# Patient Record
Sex: MALE | Race: WHITE | NOT HISPANIC OR LATINO | Employment: OTHER | URBAN - NONMETROPOLITAN AREA
[De-identification: names, ages, dates, MRNs, and addresses within clinical notes are randomized per-mention and may not be internally consistent; named-entity substitution may affect disease eponyms.]

---

## 2023-11-22 PROBLEM — R00.1 SINUS BRADYCARDIA: Status: ACTIVE | Noted: 2023-11-22

## 2023-11-22 PROBLEM — R07.9 CHEST PAIN: Status: ACTIVE | Noted: 2023-11-22

## 2023-11-22 PROBLEM — I25.10 ARTERIOSCLEROTIC CORONARY ARTERY DISEASE: Status: ACTIVE | Noted: 2023-11-22

## 2023-11-22 PROBLEM — Z95.1 S/P CABG (CORONARY ARTERY BYPASS GRAFT): Status: ACTIVE | Noted: 2023-11-22

## 2023-11-22 PROBLEM — I44.2 COMPLETE HEART BLOCK (MULTI): Status: ACTIVE | Noted: 2023-11-22

## 2023-11-22 PROBLEM — E78.5 HYPERLIPIDEMIA: Status: ACTIVE | Noted: 2023-11-22

## 2023-11-22 PROBLEM — I48.0 PAROXYSMAL ATRIAL FIBRILLATION (MULTI): Status: ACTIVE | Noted: 2023-11-22

## 2023-11-22 PROBLEM — I44.0 AV BLOCK, 1ST DEGREE: Status: ACTIVE | Noted: 2023-11-22

## 2023-11-22 PROBLEM — I95.9 HYPOTENSION: Status: ACTIVE | Noted: 2023-11-22

## 2023-11-22 PROBLEM — Z85.46 H/O PROSTATE CANCER: Status: ACTIVE | Noted: 2023-11-22

## 2023-11-22 PROBLEM — Z95.0 CARDIAC PACEMAKER IN SITU: Status: ACTIVE | Noted: 2023-11-22

## 2023-11-22 RX ORDER — CHOLECALCIFEROL (VITAMIN D3) 25 MCG
1 TABLET ORAL DAILY
COMMUNITY

## 2023-11-22 RX ORDER — OXYCODONE AND ACETAMINOPHEN 5; 325 MG/1; MG/1
1 TABLET ORAL
COMMUNITY
End: 2023-11-27 | Stop reason: ALTCHOICE

## 2023-11-22 RX ORDER — CLOPIDOGREL BISULFATE 75 MG/1
1 TABLET ORAL DAILY
COMMUNITY
Start: 2022-02-02 | End: 2024-01-08

## 2023-11-22 RX ORDER — ATORVASTATIN CALCIUM 10 MG/1
1 TABLET, FILM COATED ORAL NIGHTLY
COMMUNITY
Start: 2022-02-02 | End: 2024-02-27

## 2023-11-22 RX ORDER — ASPIRIN 81 MG/1
81 TABLET ORAL DAILY
COMMUNITY
Start: 2021-08-18 | End: 2023-12-06

## 2023-11-22 RX ORDER — AMIODARONE HYDROCHLORIDE 200 MG/1
200 TABLET ORAL DAILY
COMMUNITY
Start: 2022-02-24 | End: 2024-02-26

## 2023-11-22 RX ORDER — POTASSIUM CHLORIDE 750 MG/1
1 TABLET, FILM COATED, EXTENDED RELEASE ORAL DAILY
COMMUNITY
End: 2024-01-08

## 2023-11-27 ENCOUNTER — OFFICE VISIT (OUTPATIENT)
Dept: CARDIOLOGY | Facility: CLINIC | Age: 88
End: 2023-11-27
Payer: MEDICARE

## 2023-11-27 VITALS
SYSTOLIC BLOOD PRESSURE: 130 MMHG | WEIGHT: 177 LBS | HEART RATE: 71 BPM | DIASTOLIC BLOOD PRESSURE: 56 MMHG | HEIGHT: 67 IN | BODY MASS INDEX: 27.78 KG/M2

## 2023-11-27 DIAGNOSIS — I48.0 PAROXYSMAL ATRIAL FIBRILLATION (MULTI): ICD-10-CM

## 2023-11-27 DIAGNOSIS — H34.212 HOLLENHORST PLAQUE, LEFT EYE: ICD-10-CM

## 2023-11-27 DIAGNOSIS — I25.10 ARTERIOSCLEROTIC CORONARY ARTERY DISEASE: Primary | ICD-10-CM

## 2023-11-27 DIAGNOSIS — Z79.899 HIGH RISK MEDICATION USE: ICD-10-CM

## 2023-11-27 DIAGNOSIS — Z95.0 CARDIAC PACEMAKER IN SITU: ICD-10-CM

## 2023-11-27 DIAGNOSIS — E78.2 MIXED HYPERLIPIDEMIA: ICD-10-CM

## 2023-11-27 PROBLEM — I44.2 COMPLETE HEART BLOCK (MULTI): Status: RESOLVED | Noted: 2023-11-22 | Resolved: 2023-11-27

## 2023-11-27 PROBLEM — I44.0 AV BLOCK, 1ST DEGREE: Status: RESOLVED | Noted: 2023-11-22 | Resolved: 2023-11-27

## 2023-11-27 PROBLEM — Z95.1 S/P CABG (CORONARY ARTERY BYPASS GRAFT): Status: RESOLVED | Noted: 2023-11-22 | Resolved: 2023-11-27

## 2023-11-27 PROBLEM — R07.9 CHEST PAIN: Status: RESOLVED | Noted: 2023-11-22 | Resolved: 2023-11-27

## 2023-11-27 PROBLEM — R00.1 SINUS BRADYCARDIA: Status: RESOLVED | Noted: 2023-11-22 | Resolved: 2023-11-27

## 2023-11-27 PROBLEM — I10 ESSENTIAL HYPERTENSION: Status: ACTIVE | Noted: 2023-11-27

## 2023-11-27 PROBLEM — I95.9 HYPOTENSION: Status: RESOLVED | Noted: 2023-11-22 | Resolved: 2023-11-27

## 2023-11-27 PROBLEM — I10 ESSENTIAL HYPERTENSION: Status: RESOLVED | Noted: 2023-11-27 | Resolved: 2023-11-27

## 2023-11-27 PROCEDURE — 1159F MED LIST DOCD IN RCRD: CPT | Performed by: NURSE PRACTITIONER

## 2023-11-27 PROCEDURE — 1160F RVW MEDS BY RX/DR IN RCRD: CPT | Performed by: NURSE PRACTITIONER

## 2023-11-27 PROCEDURE — 93000 ELECTROCARDIOGRAM COMPLETE: CPT | Performed by: NURSE PRACTITIONER

## 2023-11-27 PROCEDURE — 99214 OFFICE O/P EST MOD 30 MIN: CPT | Performed by: NURSE PRACTITIONER

## 2023-11-27 PROCEDURE — 1036F TOBACCO NON-USER: CPT | Performed by: NURSE PRACTITIONER

## 2023-11-27 RX ORDER — ACETAMINOPHEN 500 MG
500 TABLET ORAL 2 TIMES DAILY
COMMUNITY
Start: 2023-10-20

## 2023-11-27 ASSESSMENT — ENCOUNTER SYMPTOMS
IRREGULAR HEARTBEAT: 0
SYNCOPE: 0
ORTHOPNEA: 0
PALPITATIONS: 0
PND: 0
DYSPNEA ON EXERTION: 0
NEAR-SYNCOPE: 0

## 2023-11-27 NOTE — ASSESSMENT & PLAN NOTE
[facility] 2272 Assurity dual-chamber permanent pacemaker  Device interrogation February 2023 V paced 99%  Reports device interrogation was completed last week, will obtain

## 2023-11-27 NOTE — ASSESSMENT & PLAN NOTE
Amiodarone start date January 2019  Surveillance testing June 2023  EKG in office today normal sinus rhythm, QTc 491

## 2023-11-27 NOTE — PATIENT INSTRUCTIONS
Please bring all medicines, vitamins, and herbal supplements with you when you come to the office.    Prescriptions will not be filled unless you are compliant with your follow up appointments or have a follow up appointment scheduled as per instruction of your physician. Refills should be requested at the time of your visit.    EKG done in office today    PLAN:   Through informed decision making process incorporating patients unique circumstances, the following treatment plan will be initiated:    1.  Prescription drug management of cardiovascular medication for efficacy, adherence to treatment, side effect assessment and polypharmacy. Current treatment clinically warranted and to continue without modifications.    2.  Carotid USN (hollenhorst plaque, PAF)    3. Return for follow-up; in the interim, contact the office if new symptoms arise.  Dr. [facility] 9 months     4.  Amiodarone surveillance and device interrogation as arranged

## 2023-11-27 NOTE — PROGRESS NOTES
"Chief Complaint  \" Doing fairly well\"    Reason for Visit  Routine 9-month follow-up  Patient presents to the office today for outpatient follow-up for coronary artery disease, normal LVEF, secondary prevention and paroxysmal atrial fibrillation.  Last evaluated in clinic by Dr. Ramirez March 2023.    Presents today ambulatory with steady gait.  Accompanied by patient  Patient continues to follow routinely with PCP.  Within the last month he has had skin cancer removal and DAPT was interrupted.  Reports being diagnosed with COVID approximately 2 weeks ago with residual URI symptoms.  Did not require hospitalization.    History of Present Illness   Patient is a extremely pleasant 88-year-old gentleman who presents to the office today reportedly\" just slowing down\" due to recent COVID-positive illness.  On a daily basis he completes ADLs, he has stairs at home that he has to go up and down.  He ambulated in from the parking without complaints.  He denies any change in exercise capacity or functional tolerance.  He is unable to recall prior anginal symptoms.  He denies any type of palpitations.    There is a request from ophthalmology appointment that due to new left Hollenhorst plaque would recommend carotid ultrasound.  Will obtain.  Otherwise we will check recent device interrogation to verify no atrial fibrillation, if increased A-fib burden may need to consider DOAC.    Patient reports that overall has no complaint(s) of chest pain, dyspnea, exertional chest pressure/discomfort, lower extremity edema, orthopnea, and syncope    Daily activity:    ADLs, functional ADLs, is able to go to the grocery store, has stairs at home.  Denies any change in exercise capacity or functional tolerance since last office visit.    The importance of secondary prevention reviewed:  HTN: No history  HLD: Treated with labs through PCP  DM: Denies  Smoker: Denies  BMI:  Reviewed the merits of healthy lifestyle choices on overall " "cardiovascular health.    Review of Systems   Cardiovascular:  Negative for chest pain, dyspnea on exertion, irregular heartbeat, leg swelling, near-syncope, orthopnea, palpitations, paroxysmal nocturnal dyspnea and syncope.        Visit Vitals  /56 (BP Location: Left arm, Patient Position: Sitting)   Pulse 71   Ht 1.702 m (5' 7\")   Wt 80.3 kg (177 lb)   BMI 27.72 kg/m²   Smoking Status Former   BSA 1.95 m²     Physical Exam  Vitals and nursing note reviewed.   Constitutional:       Appearance: Normal appearance.   Cardiovascular:      Rate and Rhythm: Normal rate and regular rhythm.      Heart sounds: Normal heart sounds.   Pulmonary:      Effort: Pulmonary effort is normal.      Breath sounds: Normal breath sounds.   Musculoskeletal:      Cervical back: Full passive range of motion without pain.      Right lower leg: No edema.      Left lower leg: No edema.   Skin:     General: Skin is cool.   Neurological:      Mental Status: He is alert and oriented to person, place, and time.   Psychiatric:         Attention and Perception: Attention normal.         Mood and Affect: Mood normal.         Behavior: Behavior is cooperative.      No Known Allergies    Current Outpatient Medications   Medication Instructions    acetaminophen (TYLENOL) 500 mg, oral, 2 times daily    amiodarone (PACERONE) 200 mg, oral, Daily    aspirin 81 mg, oral, Daily    atorvastatin (Lipitor) 10 mg tablet 1 tablet, oral, Nightly    cholecalciferol (Vitamin D-3) 25 MCG (1000 UT) tablet 1 tablet, oral, Daily    clopidogrel (Plavix) 75 mg tablet 1 tablet, oral, Daily    multivit-min/ferrous fumarate (MULTI VITAMIN ORAL) 1 tablet, oral, Daily    potassium chloride CR 10 mEq ER tablet 1 tablet, oral, Daily      Assessment:    High risk medication use  Amiodarone start date January 2019  Surveillance testing June 2023  EKG in office today normal sinus rhythm, QTc 491    Arteriosclerotic coronary artery disease  2006 coronary bypass grafting " x4  LIMA-LAD  SVG  - diagonal  SVG-circumflex  SVG-RCA    Current daily activity at 4 METS without concerning symptoms.    Cardiac and Vasculature  2019 TTE  EF 60 to 65%  MR mild  Pulmonary hypertension moderate    Cardiac pacemaker in situ  [facility] 2272 Assurity dual-chamber permanent pacemaker  Device interrogation February 2023 V paced 99%  Reports device interrogation was completed last week, will obtain    Hyperlipidemia  Low intensity statin  Annual wellness labs through PCP    Paroxysmal atrial fibrillation (CMS/HCC)  Maintaining normal sinus rhythm on amiodarone since January 2019    CHADS VASc 3 with decision not to anticoagulate due to maintenance of sinus rhythm and prior ligation left atrial appendage at time of CABG    BMI 27.0-27.9,adult  Reviewed the merits of healthy lifestyle choices on overall cardiovascular health.      Hollenhorst plaque, left eye  Noted September 2023 ophthalmology visit with recommendations for carotid ultrasound.    Plan:     Through informed decision making process incorporating patients unique circumstances, the following treatment plan will be initiated:    1.  Prescription drug management of cardiovascular medication for efficacy, adherence to treatment, side effect assessment and polypharmacy. Current treatment clinically warranted and to continue without modifications.    2.  Carotid USN (hollenhorst plaque, PAF)    3. Return for follow-up; in the interim, contact the office if new symptoms arise.  Dr. [facility] 9 months     4.  Amiodarone surveillance and device interrogation as arranged    Alisa Beckham  MSN, APRN-CNP, PMHNP-[facility]    Please excuse any errors in grammar or translation related to this dictation. Voice recognition software was utilized to prepare this document.

## 2023-11-27 NOTE — ASSESSMENT & PLAN NOTE
2006 coronary bypass grafting x4  LIMA-LAD  SVG  - diagonal  SVG-circumflex  SVG-RCA    Current daily activity at 4 METS without concerning symptoms.

## 2023-11-27 NOTE — ASSESSMENT & PLAN NOTE
Maintaining normal sinus rhythm on amiodarone since January 2019    CHADS VASc 3 with decision not to anticoagulate due to maintenance of sinus rhythm and prior ligation left atrial appendage at time of CABG

## 2023-12-01 DIAGNOSIS — I25.10 ARTERIOSCLEROTIC CORONARY ARTERY DISEASE: ICD-10-CM

## 2023-12-06 RX ORDER — ASPIRIN 81 MG/1
81 TABLET ORAL DAILY
Qty: 90 TABLET | Refills: 1 | Status: SHIPPED | OUTPATIENT
Start: 2023-12-06

## 2023-12-13 ENCOUNTER — TELEPHONE (OUTPATIENT)
Dept: CARDIOLOGY | Facility: CLINIC | Age: 88
End: 2023-12-13
Payer: MEDICARE

## 2023-12-13 DIAGNOSIS — Z79.899 HIGH RISK MEDICATION USE: ICD-10-CM

## 2023-12-13 DIAGNOSIS — I48.0 PAROXYSMAL ATRIAL FIBRILLATION (MULTI): ICD-10-CM

## 2023-12-13 NOTE — TELEPHONE ENCOUNTER
Amiodarone testing orders sent to [facility] due in December.  PFT paper order also completed.

## 2023-12-15 DIAGNOSIS — C44.90 PLEOMORPHIC DERMAL SARCOMA: Primary | ICD-10-CM

## 2023-12-15 NOTE — TUMOR BOARD NOTE
General Patient Information  Name:  Baltazar Remy  Evaluation #:  1  Conference Date:  12-  YOB: 1935  MRN:  [id number]  Program Physician(s):  Jorge Cramer  Referring Physician(s):  Preeti Calderon, Nick Oleary, Aris Goins (PCP), Asiya Tobias      Summary   Stage:  IIIB ([id number])    Assessment:  Scalp with a pleomorphic dermal sarcoma, with perineural and perivascular invasion, peripheral margin positive for a small focus at 1 o'clock, stage pT3 (outside pathology).  CT chest showed prominent periesophageal, periportal, and right axillary lymph nodes that cannot exclude developing metastatic disease and a 2 cm indeterminate lesion of the left adrenal gland.    Recommendation:  PET/CT.  Consider LN biopsy.  Resect the margin.  Radiation therapy.  If not resectable, consider systemic therapy.    Review Multidisciplinary Cutaneous Oncology Conference recommendation with patient.  Continue routine follow up and total body skin exams with Dermatology.    Follow Up:  Preeti Calderon, Asiya Tobias, Dermatology      History and Physical Exam  Dermatologic History:   88 y.o. male with an excision of the scalp on 11-3-2023 showing a pleomorphic dermal sarcoma, with perineural and perivascular invasion, peripheral margin positive for a small focus at 1 o'clock, stage pT3 (outside pathology).  CT chest on 12- showed prominent periesophageal, periportal, and right axillary lymph nodes that cannot exclude developing metastatic disease and a 2 cm indeterminate lesion of the left adrenal gland.    Past Medical History:  Prostate cancer 2007 s/p radiation, in remission    Family History of DNS/MM:  Unknown    Skin:  Large surgical incision healing well with 1 area of superficial wound dehiscence subcentimeter in size.  There is also a small palpable soft seroma at the location of the primary tumor on the left scalp.    Lymphatic:  No cervical, supraclavicular, or inguinal adenopathy.  After  reviewing his chest CT, he was noted to have palpable right axillary about 2 cm, mobile, nontender.

## 2023-12-18 ENCOUNTER — LAB REQUISITION (OUTPATIENT)
Dept: LAB | Facility: HOSPITAL | Age: 88
End: 2023-12-18
Payer: MEDICARE

## 2023-12-18 ENCOUNTER — TUMOR BOARD CONFERENCE (OUTPATIENT)
Dept: HEMATOLOGY/ONCOLOGY | Facility: HOSPITAL | Age: 88
End: 2023-12-18
Payer: MEDICARE

## 2023-12-18 DIAGNOSIS — L98.9 DISORDER OF THE SKIN AND SUBCUTANEOUS TISSUE, UNSPECIFIED: ICD-10-CM

## 2023-12-18 PROCEDURE — 88321 CONSLTJ&REPRT SLD PREP ELSWR: CPT

## 2023-12-18 PROCEDURE — 88321 CONSLTJ&REPRT SLD PREP ELSWR: CPT | Performed by: DERMATOLOGY

## 2023-12-19 ENCOUNTER — APPOINTMENT (OUTPATIENT)
Dept: CARDIOLOGY | Facility: CLINIC | Age: 88
End: 2023-12-19
Payer: MEDICARE

## 2023-12-20 ENCOUNTER — HOSPITAL ENCOUNTER (OUTPATIENT)
Dept: CARDIOLOGY | Facility: CLINIC | Age: 88
Discharge: HOME | End: 2023-12-20
Payer: MEDICARE

## 2023-12-20 DIAGNOSIS — H34.212 HOLLENHORST PLAQUE, LEFT EYE: ICD-10-CM

## 2023-12-20 PROCEDURE — 93880 EXTRACRANIAL BILAT STUDY: CPT

## 2023-12-20 PROCEDURE — 93880 EXTRACRANIAL BILAT STUDY: CPT | Performed by: INTERNAL MEDICINE

## 2023-12-21 LAB
PATH REPORT.FINAL DX SPEC: NORMAL
PATH REPORT.GROSS SPEC: NORMAL
PATH REPORT.MICROSCOPIC SPEC OTHER STN: NORMAL
PATH REPORT.RELEVANT HX SPEC: NORMAL
PATH REPORT.TOTAL CANCER: NORMAL

## 2024-01-04 ENCOUNTER — TELEPHONE (OUTPATIENT)
Dept: CARDIOLOGY | Facility: CLINIC | Age: 89
End: 2024-01-04
Payer: MEDICARE

## 2024-01-04 DIAGNOSIS — I65.29 STENOSIS OF CAROTID ARTERY, UNSPECIFIED LATERALITY: Primary | ICD-10-CM

## 2024-01-04 DIAGNOSIS — H34.213 HOLLENHORST PLAQUE, BOTH EYES: ICD-10-CM

## 2024-01-04 NOTE — TELEPHONE ENCOUNTER
Results were successfully communicated with the patient and they acknowledged their understanding.    Gave orders verbalized understanding. Task sent to  to call and arrange once order signed.

## 2024-01-04 NOTE — TELEPHONE ENCOUNTER
----- Message from [facility] sent at 1/3/2024 10:15 AM EST -----  B/L 50-69% carotid stenosis  Continue ASA/statin as he is doing  Repeat carotid USN in one year   ----- Message -----  From: Nikia, Syngo - Cardiology Results In  Sent: 12/20/2023   3:37 PM EST  To: [facility]

## 2024-01-08 DIAGNOSIS — I25.10 ARTERIOSCLEROTIC CORONARY ARTERY DISEASE: ICD-10-CM

## 2024-01-08 DIAGNOSIS — I48.0 PAROXYSMAL ATRIAL FIBRILLATION (MULTI): ICD-10-CM

## 2024-01-08 RX ORDER — POTASSIUM CHLORIDE 750 MG/1
10 TABLET, EXTENDED RELEASE ORAL DAILY
Qty: 90 TABLET | Refills: 3 | Status: SHIPPED | OUTPATIENT
Start: 2024-01-08

## 2024-01-08 RX ORDER — CLOPIDOGREL BISULFATE 75 MG/1
75 TABLET ORAL DAILY
Qty: 90 TABLET | Refills: 3 | Status: SHIPPED | OUTPATIENT
Start: 2024-01-08

## 2024-02-02 ENCOUNTER — TELEPHONE (OUTPATIENT)
Dept: CARDIOLOGY | Facility: CLINIC | Age: 89
End: 2024-02-02
Payer: MEDICARE

## 2024-02-02 NOTE — TELEPHONE ENCOUNTER
Patient was due for Amiodarone testing at [facility]. PFT in chart. No labs or xray. Attempted to phone patient. No answer.  Unable to leave message.

## 2024-02-09 ENCOUNTER — TELEPHONE (OUTPATIENT)
Dept: CARDIOLOGY | Facility: CLINIC | Age: 89
End: 2024-02-09
Payer: MEDICARE

## 2024-02-09 NOTE — TELEPHONE ENCOUNTER
[facility] would like to perform a radiofrequent ablation on his back. They are requesting POC for the procedure to  be done, no need to hold medications.    Phone [phone]  Fax [phone]    To Dr. [facility] MD for review.

## 2024-02-17 DIAGNOSIS — I48.0 PAROXYSMAL ATRIAL FIBRILLATION (MULTI): ICD-10-CM

## 2024-02-23 NOTE — TELEPHONE ENCOUNTER
Spoke with patient. He has been taking Amiodarone 1/2 tablet daily. Previous EHR does show 200 mg 1/2 tablet daily.     To Dr. [facility] MD to verify RX

## 2024-02-26 DIAGNOSIS — E78.2 MIXED HYPERLIPIDEMIA: ICD-10-CM

## 2024-02-26 RX ORDER — AMIODARONE HYDROCHLORIDE 200 MG/1
100 TABLET ORAL DAILY
Qty: 45 TABLET | Refills: 1 | Status: SHIPPED | OUTPATIENT
Start: 2024-02-26 | End: 2024-03-14

## 2024-02-27 RX ORDER — ATORVASTATIN CALCIUM 10 MG/1
10 TABLET, FILM COATED ORAL NIGHTLY
Qty: 90 TABLET | Refills: 3 | Status: SHIPPED | OUTPATIENT
Start: 2024-02-27 | End: 2025-02-26

## 2024-03-14 DIAGNOSIS — I48.0 PAROXYSMAL ATRIAL FIBRILLATION (MULTI): ICD-10-CM

## 2024-03-14 RX ORDER — AMIODARONE HYDROCHLORIDE 200 MG/1
TABLET ORAL
Qty: 45 TABLET | Refills: 1 | Status: SHIPPED | OUTPATIENT
Start: 2024-03-14

## 2024-05-20 PROCEDURE — 93280 PM DEVICE PROGR EVAL DUAL: CPT | Performed by: INTERNAL MEDICINE

## 2024-08-05 ENCOUNTER — APPOINTMENT (OUTPATIENT)
Dept: CARDIOLOGY | Facility: CLINIC | Age: 89
End: 2024-08-05
Payer: MEDICARE

## 2024-08-06 ENCOUNTER — APPOINTMENT (OUTPATIENT)
Dept: CARDIOLOGY | Facility: CLINIC | Age: 89
End: 2024-08-06
Payer: MEDICARE

## 2024-09-04 ENCOUNTER — TELEPHONE (OUTPATIENT)
Dept: CARDIOLOGY | Facility: CLINIC | Age: 89
End: 2024-09-04
Payer: MEDICARE

## 2024-09-04 DIAGNOSIS — I44.2 ATRIOVENTRICULAR BLOCK, COMPLETE (MULTI): ICD-10-CM

## 2024-09-04 DIAGNOSIS — Z95.0 CARDIAC PACEMAKER IN SITU: ICD-10-CM

## 2024-09-05 ENCOUNTER — APPOINTMENT (OUTPATIENT)
Dept: CARDIOLOGY | Facility: CLINIC | Age: 89
End: 2024-09-05
Payer: MEDICARE

## 2024-09-23 ENCOUNTER — APPOINTMENT (OUTPATIENT)
Dept: CARDIOLOGY | Facility: CLINIC | Age: 89
End: 2024-09-23
Payer: MEDICARE

## 2024-11-11 ENCOUNTER — APPOINTMENT (OUTPATIENT)
Dept: CARDIOLOGY | Facility: CLINIC | Age: 89
End: 2024-11-11
Payer: MEDICARE

## 2024-11-11 VITALS
SYSTOLIC BLOOD PRESSURE: 118 MMHG | BODY MASS INDEX: 26.09 KG/M2 | HEART RATE: 61 BPM | HEIGHT: 67 IN | DIASTOLIC BLOOD PRESSURE: 58 MMHG | WEIGHT: 166.2 LBS

## 2024-11-11 DIAGNOSIS — E78.2 MIXED HYPERLIPIDEMIA: ICD-10-CM

## 2024-11-11 DIAGNOSIS — I48.0 PAROXYSMAL ATRIAL FIBRILLATION (MULTI): ICD-10-CM

## 2024-11-11 DIAGNOSIS — Z87.891 FORMER CIGAR SMOKER: ICD-10-CM

## 2024-11-11 DIAGNOSIS — Z76.89 ENCOUNTER TO ESTABLISH CARE WITH NEW DOCTOR: ICD-10-CM

## 2024-11-11 DIAGNOSIS — Z71.2 ENCOUNTER TO DISCUSS TEST RESULTS: ICD-10-CM

## 2024-11-11 DIAGNOSIS — N18.31 STAGE 3A CHRONIC KIDNEY DISEASE (MULTI): ICD-10-CM

## 2024-11-11 DIAGNOSIS — I25.10 ARTERIOSCLEROTIC CORONARY ARTERY DISEASE: ICD-10-CM

## 2024-11-11 DIAGNOSIS — Z79.899 HIGH RISK MEDICATION USE: ICD-10-CM

## 2024-11-11 DIAGNOSIS — Z95.0 CARDIAC PACEMAKER IN SITU: ICD-10-CM

## 2024-11-11 PROCEDURE — 99214 OFFICE O/P EST MOD 30 MIN: CPT | Performed by: INTERNAL MEDICINE

## 2024-11-11 PROCEDURE — 1157F ADVNC CARE PLAN IN RCRD: CPT | Performed by: INTERNAL MEDICINE

## 2024-11-11 PROCEDURE — G2211 COMPLEX E/M VISIT ADD ON: HCPCS | Performed by: INTERNAL MEDICINE

## 2024-11-11 PROCEDURE — 1160F RVW MEDS BY RX/DR IN RCRD: CPT | Performed by: INTERNAL MEDICINE

## 2024-11-11 PROCEDURE — 1036F TOBACCO NON-USER: CPT | Performed by: INTERNAL MEDICINE

## 2024-11-11 PROCEDURE — 93000 ELECTROCARDIOGRAM COMPLETE: CPT | Performed by: INTERNAL MEDICINE

## 2024-11-11 PROCEDURE — 1159F MED LIST DOCD IN RCRD: CPT | Performed by: INTERNAL MEDICINE

## 2024-11-11 RX ORDER — AMIODARONE HYDROCHLORIDE 200 MG/1
100 TABLET ORAL DAILY
Qty: 45 TABLET | Refills: 1 | Status: SHIPPED | OUTPATIENT
Start: 2024-11-11

## 2024-11-11 NOTE — LETTER
November 11, 2024     Aris Goins DO  [address]  [facility] [address]    Patient: Baltazar Remy   YOB: 1935   Date of Visit: 11/11/2024       Dear Dr. Aris Goins, DO:    Thank you for referring Baltazar Remy to me for evaluation. Below are my notes for this consultation.  If you have questions, please do not hesitate to call me. I look forward to following your patient along with you.       Sincerely,     Veronica Gallardo MD      CC: No Recipients  ______________________________________________________________________________________      Patient is new to this provider, last office visit was in November 2023 at which time patient was seen by Alisa Beckham NP I have reviewed the notes and incorporated some of the information into the present chart.  Subjective :     Interval review of systems is negative for chest discomfort pressure tightness heaviness palpitations lightheadedness orthopnea paroxysmal nocturnal dyspnea dependent edema or claudication TIA or CVA type symptoms or bleeding diathesis  Since last visit, patient has had extensive surgery scalp, apparently is going through chemotherapy now, and also had radiation therapy.  Details are not available at this time.  No obvious amiodarone toxicity.    History so Far :    High risk medication use  Amiodarone start date January 2019      Arteriosclerotic coronary artery disease  2006 coronary bypass grafting x4  LIMA-LAD  SVG  - diagonal  SVG-circumflex  SVG-RCA     Current daily activity at 4 METS without concerning symptoms.     Cardiac and Vasculature  2019 TTE  EF 60 to 65%  MR mild  Pulmonary hypertension moderate     Cardiac pacemaker in situ  [facility] 2272 Assurity dual-chamber permanent pacemaker  Device interrogation February 2023 V paced 99%  Reports device interrogation was completed last week, will obtain     Hyperlipidemia  Low intensity statin  Annual wellness labs through PCP     Paroxysmal  "atrial fibrillation (CMS/HCC)  Maintaining normal sinus rhythm on amiodarone since January 2019     CHADS VASc 3 with decision not to anticoagulate due to maintenance of sinus rhythm and prior ligation left atrial appendage at time of CABG     BMI 27.0-27.9,adult  Reviewed the merits of healthy lifestyle choices on overall cardiovascular health.        Hollenhorst plaque, left eye  Noted September 2023 ophthalmology visit with recommendations for carotid ultrasound.     Carotid ultrasound December 2023-50 to 69% bilateral internal carotid artery stenosis with bilateral antegrade vertebral flow peak velocity on the right 1 45 cm/s, on the left 145 cm/s  Objective   Wt Readings from Last 3 Encounters:   11/11/24 75.4 kg (166 lb 3.2 oz)   11/27/23 80.3 kg (177 lb)   03/08/23 80.7 kg (178 lb)            Vitals:    11/11/24 1241   BP: 118/58   BP Location: Right arm   Patient Position: Sitting   Pulse: 61   Weight: 75.4 kg (166 lb 3.2 oz)   Height: 1.702 m (5' 7\")        Physical Exam:    GENERAL APPEARANCE: in no acute distress.  CHEST: Symmetric and non-tender.  Pacer generator left infraclavicular area  INTEGUMENT: Skin warm and dry  HEENT: Well-healed large incision over the vortex of the head.  NECK: Supple, no JVD, no bruit.   NEURO/PSHCY: Alert and oriented x3; appropriate behavior and responses and responses  LUNGS: Clear to auscultation bilaterally; normal respiratory effort.  HEART: Rate and rhythm regular with no evident murmur; no gallop appreciated.   ABDOMEN: Soft, non tender.  MUSCULOSKELETAL: No gross deformities.  EXTREMITIES: Warm  There is no edema noted.    Meds:  Current Outpatient Medications   Medication Instructions   • acetaminophen (TYLENOL) 500 mg, 2 times daily   • amiodarone (Pacerone) 200 mg tablet TAKE 1/2 TABLET ONE TIME DAILY   • aspirin 81 mg, oral, Daily, as directed   • atorvastatin (LIPITOR) 10 mg, oral, Nightly   • cholecalciferol (Vitamin D-3) 25 MCG (1000 UT) tablet 1 tablet, Daily "   • clopidogrel (PLAVIX) 75 mg, oral, Daily   • multivit-min/ferrous fumarate (MULTI VITAMIN ORAL) 1 tablet, Daily   • potassium chloride CR 10 mEq ER tablet 10 mEq, oral, Daily          No Known Allergies    Device interrogation August 2024-atrial paced 28% ventricular paced more than 99% battery life 5 years to more changing episodes EGM review shows atrial lead noise atrial fibrillation was not reported  LABS:               October 2022-BUN 17 creatinine 1.17 GFR 59.6 sodium 141 potassium 4.6 creatinine clearance 40 liver enzymes normal TSH mildly elevated 5.6 Free T4 mildly elevated 1.19 hemoglobin 11.7 hematocrit 34.3 MCV 99.9 platelet count 1 53,000      Patient Active Problem List    Diagnosis Date Noted   • Encounter to establish care with new doctor 11/11/2024   • Former cigar smoker 11/11/2024   • Encounter to discuss test results 11/11/2024   • Stage 3a chronic kidney disease (Multi) 11/11/2024   • High risk medication use 11/27/2023   • Body mass index (BMI) 26.0-26.9, adult 11/27/2023   • Hollenhorst plaque, left eye 11/27/2023   • Arteriosclerotic coronary artery disease 11/22/2023   • Cardiac pacemaker in situ 11/22/2023   • H/O prostate cancer 11/22/2023   • Hyperlipidemia 11/22/2023   • Paroxysmal atrial fibrillation (Multi) 11/22/2023                 Assessment:    1. Arteriosclerotic coronary artery disease  Follow Up In Cardiology      2. Paroxysmal atrial fibrillation (Multi)  Follow Up In Cardiology      3. High risk medication use        4. Cardiac pacemaker in situ        5. Mixed hyperlipidemia        6. Body mass index (BMI) 26.0-26.9, adult        7. Encounter to establish care with new doctor        8. Former cigar smoker        9. Encounter to discuss test results        10. Stage 3a chronic kidney disease (Multi)           Clinical decision making:  Clinically patient is doing well.  I have no details pertaining to his malignancy and the surgery for the malignancy.  He says the  procedure was done at [facility] about 3 months ago.  He is going through chemotherapy now.  Device interrogation does not suggest any atrial fibrillation.  We will continue the low-dose amiodarone.  No clinical amiodarone toxicity at this time.  For the carotid disease, recheck carotid ultrasound prior to next visit, continue aspirin and statin therapy with LDL goal of 70 or below I do not see any recent lipid profile in the chart.  Patient is on 10 mg of atorvastatin.  He also remains on dual antiplatelet therapy.  We will check a lipid profile.  Follow up : 1 year        Provider Attestation - Lorena GREY LPN Scribe documentation    All medical record entries made by the Scribe were at my direction and personally dictated by me. I have reviewed the chart and agree that the record accurately reflects my personal performance of the history, physical exam, discussion and plan.

## 2024-11-11 NOTE — PROGRESS NOTES
Patient is new to this provider, last office visit was in November 2023 at which time patient was seen by Alisa Beckham NP I have reviewed the notes and incorporated some of the information into the present chart.  Subjective :     Interval review of systems is negative for chest discomfort pressure tightness heaviness palpitations lightheadedness orthopnea paroxysmal nocturnal dyspnea dependent edema or claudication TIA or CVA type symptoms or bleeding diathesis  Since last visit, patient has had extensive surgery scalp, apparently is going through chemotherapy now, and also had radiation therapy.  Details are not available at this time.  No obvious amiodarone toxicity.    History so Far :    High risk medication use  Amiodarone start date January 2019      Arteriosclerotic coronary artery disease  2006 coronary bypass grafting x4  LIMA-LAD  SVG  - diagonal  SVG-circumflex  SVG-RCA     Current daily activity at 4 METS without concerning symptoms.     Cardiac and Vasculature  2019 TTE  EF 60 to 65%  MR mild  Pulmonary hypertension moderate     Cardiac pacemaker in situ  [facility] 2272 Assurity dual-chamber permanent pacemaker  Device interrogation February 2023 V paced 99%  Reports device interrogation was completed last week, will obtain     Hyperlipidemia  Low intensity statin  Annual wellness labs through PCP     Paroxysmal atrial fibrillation (CMS/[facility])  Maintaining normal sinus rhythm on amiodarone since January 2019     CHADS VASc 3 with decision not to anticoagulate due to maintenance of sinus rhythm and prior ligation left atrial appendage at time of CABG     BMI 27.0-27.9,adult  Reviewed the merits of healthy lifestyle choices on overall cardiovascular health.        [place] plaque, left eye  Noted September 2023 ophthalmology visit with recommendations for carotid ultrasound.     Carotid ultrasound December 2023-50 to 69% bilateral internal carotid artery stenosis with bilateral antegrade  "vertebral flow peak velocity on the right 1 45 cm/s, on the left 145 cm/s  Objective   Wt Readings from Last 3 Encounters:   11/11/24 75.4 kg (166 lb 3.2 oz)   11/27/23 80.3 kg (177 lb)   03/08/23 80.7 kg (178 lb)            Vitals:    11/11/24 1241   BP: 118/58   BP Location: Right arm   Patient Position: Sitting   Pulse: 61   Weight: 75.4 kg (166 lb 3.2 oz)   Height: 1.702 m (5' 7\")        Physical Exam:    GENERAL APPEARANCE: in no acute distress.  CHEST: Symmetric and non-tender.  Pacer generator left infraclavicular area  INTEGUMENT: Skin warm and dry  HEENT: Well-healed large incision over the vortex of the head.  NECK: Supple, no JVD, no bruit.   NEURO/PSHCY: Alert and oriented x3; appropriate behavior and responses and responses  LUNGS: Clear to auscultation bilaterally; normal respiratory effort.  HEART: Rate and rhythm regular with no evident murmur; no gallop appreciated.   ABDOMEN: Soft, non tender.  MUSCULOSKELETAL: No gross deformities.  EXTREMITIES: Warm  There is no edema noted.    Meds:  Current Outpatient Medications   Medication Instructions    acetaminophen (TYLENOL) 500 mg, 2 times daily    amiodarone (Pacerone) 200 mg tablet TAKE 1/2 TABLET ONE TIME DAILY    aspirin 81 mg, oral, Daily, as directed    atorvastatin (LIPITOR) 10 mg, oral, Nightly    cholecalciferol (Vitamin D-3) 25 MCG (1000 UT) tablet 1 tablet, Daily    clopidogrel (PLAVIX) 75 mg, oral, Daily    multivit-min/ferrous fumarate (MULTI VITAMIN ORAL) 1 tablet, Daily    potassium chloride CR 10 mEq ER tablet 10 mEq, oral, Daily          No Known Allergies    Device interrogation August 2024-atrial paced 28% ventricular paced more than 99% battery life 5 years to more changing episodes EGM review shows atrial lead noise atrial fibrillation was not reported  LABS:               October 2022-BUN 17 creatinine 1.17 GFR 59.6 sodium 141 potassium 4.6 creatinine clearance 40 liver enzymes normal TSH mildly elevated 5.6 Free T4 mildly elevated " 1.19 hemoglobin 11.7 hematocrit 34.3 MCV 99.9 platelet count 1 53,000      Patient Active Problem List    Diagnosis Date Noted    Encounter to establish care with new doctor 11/11/2024    Former cigar smoker 11/11/2024    Encounter to discuss test results 11/11/2024    Stage 3a chronic kidney disease (Multi) 11/11/2024    High risk medication use 11/27/2023    Body mass index (BMI) 26.0-26.9, adult 11/27/2023    Hollenhorst plaque, left eye 11/27/2023    Arteriosclerotic coronary artery disease 11/22/2023    Cardiac pacemaker in situ 11/22/2023    H/O prostate cancer 11/22/2023    Hyperlipidemia 11/22/2023    Paroxysmal atrial fibrillation (Multi) 11/22/2023                 Assessment:    1. Arteriosclerotic coronary artery disease  Follow Up In Cardiology      2. Paroxysmal atrial fibrillation (Multi)  Follow Up In Cardiology      3. High risk medication use        4. Cardiac pacemaker in situ        5. Mixed hyperlipidemia        6. Body mass index (BMI) 26.0-26.9, adult        7. Encounter to establish care with new doctor        8. Former cigar smoker        9. Encounter to discuss test results        10. Stage 3a chronic kidney disease (Multi)           Clinical decision making:  Clinically patient is doing well.  I have no details pertaining to his malignancy and the surgery for the malignancy.  He says the procedure was done at [facility] about 3 months ago.  He is going through chemotherapy now.  Device interrogation does not suggest any atrial fibrillation.  We will continue the low-dose amiodarone.  No clinical amiodarone toxicity at this time.  For the carotid disease, recheck carotid ultrasound prior to next visit, continue aspirin and statin therapy with LDL goal of 70 or below I do not see any recent lipid profile in the chart.  Patient is on 10 mg of atorvastatin.  He also remains on dual antiplatelet therapy.  We will check a lipid profile.  Follow up : 1 year        Provider Attestation - Lorena GREY  LPN Scribe documentation    All medical record entries made by the Scribe were at my direction and personally dictated by me. I have reviewed the chart and agree that the record accurately reflects my personal performance of the history, physical exam, discussion and plan.

## 2024-11-11 NOTE — PATIENT INSTRUCTIONS
Please bring all medicines, vitamins, and herbal supplements with you when you come to the office.    Prescriptions will not be filled unless you are compliant with your follow up appointments or have a follow up appointment scheduled as per instruction of your physician. Refills should be requested at the time of your visit.     Pacemaker/Defibrillator follow up per routine     Amiodarone follow up per routine

## 2024-12-09 DIAGNOSIS — I25.10 ARTERIOSCLEROTIC CORONARY ARTERY DISEASE: ICD-10-CM

## 2024-12-11 RX ORDER — POTASSIUM CHLORIDE 750 MG/1
10 TABLET, EXTENDED RELEASE ORAL DAILY
Qty: 90 TABLET | Refills: 3 | Status: SHIPPED | OUTPATIENT
Start: 2024-12-11

## 2024-12-11 RX ORDER — CLOPIDOGREL BISULFATE 75 MG/1
75 TABLET ORAL DAILY
Qty: 90 TABLET | Refills: 3 | Status: SHIPPED | OUTPATIENT
Start: 2024-12-11

## 2024-12-11 NOTE — TELEPHONE ENCOUNTER
We have notified the secretaries about scheduling the patients 1-2 weeks prior to the ordered follow up. Please fill

## 2024-12-11 NOTE — TELEPHONE ENCOUNTER
We have notified the secretaries about scheduling appt 1-2 weeks before the follow up date. Please refill.

## 2025-01-02 ENCOUNTER — TUMOR BOARD CONFERENCE (OUTPATIENT)
Dept: OTOLARYNGOLOGY | Facility: HOSPITAL | Age: OVER 89
End: 2025-01-02

## 2025-01-02 NOTE — TUMOR BOARD NOTE
[facility] HEAD AND NECK TUMOR BOARD NOTE:    Baltazar Remy Is a 89 y.o. male who was presented at [facility] Head & Neck Tumor Board on 1/3/2024 which included representatives from all Head & Neck disciplines (Medical oncology/Radiation oncology/Otolaryngology/Radiology/Pathology).     History and Physical in Brief:  89 y.o. male with pmhx of CAD, paroxysmal afib with pacemaker, prostate ca, who presented at an OSH and has been followed by Dr. 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 He had a recurrence (12/27/2023) and underwent radiation therapy with Keytruda that was completed in Jan 2024. PET on 5/8/2024 found highly positive left posteroinferior cervical node and he is s/p left posterolateral neck dissection in May 2024. Last month, patient underwent left preauricular and left posterior neck lymph node biopsy positive for metastatic SCCa with JEZ.       Imaging:  CT Neck with Contrast (11/1/2023):   Enhancing scalp mass in the left frontal region measuring up to 2.7 cm, appears increased in size from the ultrasound 9/29/2023, and most suspicious for neoplasm given these findings. Differential also includes infectious/inflammatory etiologies.     Chest CT with Contrast (12/14/2023):   Prominent periesophageal, periportal, and right axillary lymph nodes that cannot exclude developing metastatic disease and a 2 cm indeterminate lesion of the left adrenal gland.     PET/CT Head and Neck (11/11/2024):   - FDG avid lymph node along the left parotid space measuring with a maximum SUV of 6.7.    - An ill-defined focus of FDG accumulation in the left supraclavicular soft tissues which may represent a supraclavicular lymph node indistinguishable from the trapezius muscle.  There is a maximum SUV of 10.4.    - Mildly FDG avid prominent  lymph nodes in the axilla decreasing in size and radiotracer intensity when compared to the prior exam now with a maximum SUV of 2.9.  This suggests treatment response within the axilla.       Procedures to date:  -12/9/2024: Left preauricular lymph node biopsy, left posterior neck lymph node biopsy  - 5/31/2024: Cervical lymphadenectomy, modified radical left neck dissection  - 12/28/2023: Left axillary lymph node biopsy  - 11/3/2023: wide resection of scalp mass with flap reconstruction    Pertinent Pathology:  Pathology report (12/26/2024)  A.  Left preauricular mass/lymph nodes, excisional biopsy:  - Two of two lymph nodes positive for metastatic poorly differentiated SCCa with JEZ  - Salivary gland focally involved by SCCa  B. Left posterior neck mass/lymph node, excisional biopsy:   -One of one lymph node positive for metastatic poorly differentiated SCCa with JEZ, 2.2 cm in greatest dimension  -Skeletal muscle and inked resection margin focally involved by squamous cell carcinoma  -P40 negative     The [facility] Head and Neck Tumor Board considered available treatment options and made the following staging and recommendations:    Staging and Recommendations:  Site: left Cutaneous Scalp  Stage: T4N1M1  Recommendation: updated imaging, possible needle biopsy, possible systemic treatment  Clinical Trial Status:   N/A      National site-specific guidelines were discussed with respect to the case.

## 2025-01-03 ENCOUNTER — APPOINTMENT (OUTPATIENT)
Dept: HEMATOLOGY/ONCOLOGY | Facility: HOSPITAL | Age: OVER 89
End: 2025-01-03
Payer: MEDICARE

## 2025-01-08 ENCOUNTER — APPOINTMENT (OUTPATIENT)
Dept: CARDIOLOGY | Facility: CLINIC | Age: OVER 89
End: 2025-01-08
Payer: MEDICARE

## 2025-01-22 ENCOUNTER — TELEPHONE (OUTPATIENT)
Dept: CARDIOLOGY | Facility: CLINIC | Age: OVER 89
End: 2025-01-22
Payer: MEDICARE

## 2025-01-22 DIAGNOSIS — H34.212 HOLLENHORST PLAQUE, LEFT EYE: ICD-10-CM

## 2025-01-22 DIAGNOSIS — I65.29 STENOSIS OF CAROTID ARTERY, UNSPECIFIED LATERALITY: ICD-10-CM

## 2025-02-04 ENCOUNTER — APPOINTMENT (OUTPATIENT)
Dept: CARDIOLOGY | Facility: CLINIC | Age: OVER 89
End: 2025-02-04
Payer: MEDICARE

## 2025-03-25 ENCOUNTER — APPOINTMENT (OUTPATIENT)
Dept: CARDIOLOGY | Facility: CLINIC | Age: OVER 89
End: 2025-03-25
Payer: MEDICARE

## 2025-05-22 ENCOUNTER — HOSPITAL ENCOUNTER (OUTPATIENT)
Dept: CARDIOLOGY | Facility: CLINIC | Age: OVER 89
Discharge: HOME | End: 2025-05-22
Payer: MEDICARE

## 2025-05-22 DIAGNOSIS — H34.213: ICD-10-CM

## 2025-05-22 DIAGNOSIS — I65.29 STENOSIS OF CAROTID ARTERY, UNSPECIFIED LATERALITY: ICD-10-CM

## 2025-05-22 DIAGNOSIS — H34.212 HOLLENHORST PLAQUE, LEFT EYE: ICD-10-CM

## 2025-05-22 DIAGNOSIS — I65.23 OCCLUSION AND STENOSIS OF BILATERAL CAROTID ARTERIES: ICD-10-CM

## 2025-05-22 PROCEDURE — 93880 EXTRACRANIAL BILAT STUDY: CPT | Performed by: INTERNAL MEDICINE

## 2025-05-22 PROCEDURE — 93880 EXTRACRANIAL BILAT STUDY: CPT

## 2025-05-23 ENCOUNTER — TELEPHONE (OUTPATIENT)
Dept: CARDIOLOGY | Facility: CLINIC | Age: OVER 89
End: 2025-05-23
Payer: MEDICARE

## 2025-05-23 NOTE — TELEPHONE ENCOUNTER
Result Communication    Resulted Orders   Vascular US Carotid Artery Duplex Bilateral    Narrative                   [facility]  [address], [address], [place], [place] [address]          Tel [phone] Fax [phone]       Vascular Lab Report     [facility] US CAROTID ARTERY DUPLEX BILATERAL    Patient Name:      URIAH ELLIS     Reading Physician:  [id number] Eduin Givens MD, [facility]  Study Date:        5/22/2025            Ordering Provider:  [id number] MELL BLANCAS  MRN/PID:           [id number]             Fellow:  Accession#:        [id number]         Technologist:       Kaycee Rodriguez [facility],                                                              [facility]  Date of Birth/Age: 1935 / 89 years Technologist 2:  Gender:            M                    Encounter#:         [id number]  Admission Status:  Outpatient           Location Performed: [facility]       Diagnosis/ICD: Occlusion and stenosis of bilateral carotid arteries-I65.23;                 Partial retinal artery occlusion, bilateral-H34.213  Indication:    Hyperlipidemia, Former Smoker, CAD, CABG-2006, Pulmonary HTN,                 Pacemaker, Paroxysmal Atrial Fibrillation, CKD-Stage III,                 Prostate Cancer, Sarcoma  CPT Codes:     73272 Cerebrovascular Carotid Duplex scan complete       CONCLUSIONS:  Right Carotid: Findings are consistent with less than 50% stenosis of the right proximal internal carotid artery. Laminar flow seen by color Doppler. There are elevated velocities in the right ECA that are suggestive of disease. No evidence of hemodynamically significant stenosis of the right common carotid artery. The right vertebral artery is patent with antegrade flow. No significant changes since 2023 based on the new criteria.  Left Carotid: Findings are consistent with less than 50% stenosis of the left proximal internal carotid artery. Laminar flow seen by  color Doppler. Left external carotid artery appears patent with no evidence of stenosis. No evidence of hemodynamically significant stenosis of the left common carotid artery. The left vertebral artery is patent with antegrade flow. No significant changes since 2023 based on the new criteria.     Imaging & Doppler Findings:  Right Plaque Morph: The proximal right external carotid artery demonstrates irregular and calcified plaque. The distal right common carotid artery demonstrates irregular and calcified plaque.  Left Plaque Morph: The proximal left external carotid artery demonstrates irregular and calcified plaque. The distal left common carotid artery demonstrates irregular and calcified plaque.      Right                   Left    PSV      EDV           PSV      EDV  119 cm/s 6 cm/s  CCA P 92 cm/s  9 cm/s  120 cm/s 13 cm/s CCA D 74 cm/s  7 cm/s  73 cm/s  11 cm/s ICA P 91 cm/s  12 cm/s  135 cm/s 20 cm/s ICA D 143 cm/s 33 cm/s  222 cm/s          ECA  153 cm/s     Right                                 Left    PSV   Waveform                       PSV    Waveform  95 cm/s          Subclavian Proximal 172 cm/s                  Right Left  ICA/CCA Ratio  0.6  1.2          [id number] Eduin Givens MD, FACC  Electronically signed by [id number] Eduin Givens MD, FACC on 5/22/2025 at 2:36:22 PM         ** Final **         1:10 PM      Results were successfully communicated with the patient and they acknowledged their understanding.

## 2025-05-23 NOTE — TELEPHONE ENCOUNTER
----- Message from Alisa Beckham sent at 5/22/2025  4:07 PM EDT -----  Please let him know carotid ultrasound showed less than 50% bilateral with no significant changes noted from prior.  No changes needed at this time.  ----- Message -----  From: Nikia, Syngo - Cardiology Results In  Sent: 5/22/2025   2:36 PM EDT  To: SUNDEEP Barton-CNP

## 2025-11-13 ENCOUNTER — APPOINTMENT (OUTPATIENT)
Dept: CARDIOLOGY | Facility: CLINIC | Age: OVER 89
End: 2025-11-13
Payer: MEDICARE